# Patient Record
Sex: MALE
[De-identification: names, ages, dates, MRNs, and addresses within clinical notes are randomized per-mention and may not be internally consistent; named-entity substitution may affect disease eponyms.]

---

## 2021-07-06 PROBLEM — Z00.00 ENCOUNTER FOR PREVENTIVE HEALTH EXAMINATION: Status: ACTIVE | Noted: 2021-07-06

## 2021-07-08 ENCOUNTER — APPOINTMENT (OUTPATIENT)
Dept: ORTHOPEDIC SURGERY | Facility: CLINIC | Age: 76
End: 2021-07-08
Payer: MEDICARE

## 2021-07-08 VITALS — SYSTOLIC BLOOD PRESSURE: 118 MMHG | HEIGHT: 67 IN | DIASTOLIC BLOOD PRESSURE: 70 MMHG | HEART RATE: 73 BPM

## 2021-07-08 DIAGNOSIS — Z87.39 PERSONAL HISTORY OF OTHER DISEASES OF THE MUSCULOSKELETAL SYSTEM AND CONNECTIVE TISSUE: ICD-10-CM

## 2021-07-08 DIAGNOSIS — Z80.8 FAMILY HISTORY OF MALIGNANT NEOPLASM OF OTHER ORGANS OR SYSTEMS: ICD-10-CM

## 2021-07-08 DIAGNOSIS — M25.572 PAIN IN LEFT ANKLE AND JOINTS OF LEFT FOOT: ICD-10-CM

## 2021-07-08 DIAGNOSIS — Z80.0 FAMILY HISTORY OF MALIGNANT NEOPLASM OF DIGESTIVE ORGANS: ICD-10-CM

## 2021-07-08 DIAGNOSIS — Z78.9 OTHER SPECIFIED HEALTH STATUS: ICD-10-CM

## 2021-07-08 DIAGNOSIS — M79.672 PAIN IN LEFT FOOT: ICD-10-CM

## 2021-07-08 DIAGNOSIS — Z86.39 PERSONAL HISTORY OF OTHER ENDOCRINE, NUTRITIONAL AND METABOLIC DISEASE: ICD-10-CM

## 2021-07-08 DIAGNOSIS — Z86.79 PERSONAL HISTORY OF OTHER DISEASES OF THE CIRCULATORY SYSTEM: ICD-10-CM

## 2021-07-08 PROCEDURE — 73610 X-RAY EXAM OF ANKLE: CPT | Mod: LT

## 2021-07-08 PROCEDURE — 73620 X-RAY EXAM OF FOOT: CPT | Mod: LT

## 2021-07-08 PROCEDURE — 99203 OFFICE O/P NEW LOW 30 MIN: CPT

## 2021-07-08 RX ORDER — LORAZEPAM 0.5 MG/1
0.5 TABLET ORAL
Refills: 0 | Status: ACTIVE | COMMUNITY

## 2021-07-08 RX ORDER — HYDROCHLOROTHIAZIDE 25 MG/1
25 TABLET ORAL
Refills: 0 | Status: ACTIVE | COMMUNITY

## 2021-07-08 RX ORDER — LAMOTRIGINE 200 MG/1
200 TABLET ORAL
Refills: 0 | Status: ACTIVE | COMMUNITY

## 2021-07-08 RX ORDER — DOXAZOSIN 4 MG/1
4 TABLET ORAL
Refills: 0 | Status: ACTIVE | COMMUNITY

## 2021-07-08 RX ORDER — TELMISARTAN 80 MG/1
80 TABLET ORAL
Refills: 0 | Status: ACTIVE | COMMUNITY

## 2021-07-08 RX ORDER — CARBAMAZEPINE 400 MG/1
400 TABLET, EXTENDED RELEASE ORAL
Refills: 0 | Status: ACTIVE | COMMUNITY

## 2021-07-08 RX ORDER — PRAVASTATIN SODIUM 20 MG/1
20 TABLET ORAL
Refills: 0 | Status: ACTIVE | COMMUNITY

## 2021-07-08 RX ORDER — DONEPEZIL HYDROCHLORIDE 5 MG/1
5 TABLET ORAL
Refills: 0 | Status: ACTIVE | COMMUNITY

## 2021-07-08 RX ORDER — TRAZODONE HYDROCHLORIDE 50 MG/1
50 TABLET ORAL
Refills: 0 | Status: ACTIVE | COMMUNITY

## 2021-07-08 NOTE — PROCEDURE
[de-identified] : Fitted for a cane and walking with a cane and sneaker he did have some pain relief

## 2021-07-08 NOTE — HISTORY OF PRESENT ILLNESS
[de-identified] : Mr. Leiva is a 75 yo gentleman who presents for pain in his left lateral hindfoot radiating to the fifth toe that started 2 weeks ago without any inciting event or injury.  No prior foot issues.  He has pain walking and weightbearing particularly if he is barefoot but is better if he is wearing a thick shoe such as a sneaker.  He has new balance shoes on today which are over 10 years old.  Pain is dull and sometimes achy and can be a 1-8 out of 10 and intermittent.  There was never any swelling or bruising or redness.  He had been focusing on walking heel-to-toe about 5 times a week.  He had been diagnosed with possible Parkinson's but did not respond to the medication so it is felt that it is likely something else.  He has not had any other treatment or work-up.  He is not supposed to take any NSAIDs but can take Tylenol because of kidney issues.

## 2021-07-08 NOTE — PHYSICAL EXAM
[Slightly Antalgic] : slightly antalgic [LE] : Sensory: Intact in bilateral lower extremities [DP] : dorsalis pedis 2+ and symmetric bilaterally [PT] : posterior tibial 2+ and symmetric bilaterally [Normal RLE] : Right Lower Extremity: No scars, rashes, lesions, ulcers, skin intact [Normal LLE] : Left Lower Extremity: No scars, rashes, lesions, ulcers, skin intact [Normal Touch] : sensation intact for touch [Normal] : Oriented to person, place, and time, insight and judgement were intact and the affect was normal [de-identified] : Left foot and ankle\par When walking he feels pain more in the plantar lateral heel.\par He can raise up on his heels and toes.\par Mildly antalgic gait.\par Hyperpronation/pes planovalgus deformity left slightly greater than right.\par No edema, ecchymoses, erythema.\par Tender lateral > plantar calcaneus, peroneal tendons around the tip of the fibula, mildly tender cuboid.  Nontender fifth metatarsal and fifth toe.  Mildly tender sinus Tarsi.\par Nontender at the first tarsometatarsal joint where there are large palpable osteophytes.\par Intact anterior tibial tendon, gastrocsoleus, peroneals, posterior tibial tendon, EHL.  Tendons all feel intact.\par Sensation is intact.\par Dorsalis pedis and posterior tibial pulses 2+. [de-identified] : \par X-rays of the left foot and ankle weightbearing 5 views were ordered and performed today with degenerative changes severe with osteophytes at the first tarsometatarsal joint.  Ankle mortise is intact without any significant arthritis.  No visible fractures seen

## 2021-07-08 NOTE — ASSESSMENT
[FreeTextEntry1] : 75 yo w/ left lateral hindfoot pain without any clear etiology.  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  His pain was difficult to localize but was significant affecting his gait particularly if he was barefoot.  Because of his Parkinson's-like symptoms and age and balance I felt it would be difficult for him to walk with a cam walker boot and therefore we will have him try wearing just a very stiff supportive shoe and suggest getting new shoes if possible and walking with a cane.  He should limit his walking for the time being and can do some warm soaks and elevate and ice as needed.\par I suggested getting an MRI to evaluate further because if there is a fracture he may need to use a boot or walker temporarily.\par He can try some Voltaren gel but cannot take NSAIDs because of kidney issues.  He can take Tylenol if needed for pain.\par I will call him with MRI results and otherwise I would see him back in the next 3 to 4 weeks.

## 2021-07-08 NOTE — REASON FOR VISIT
[Initial Visit] : an initial visit for [Spouse] : spouse [FreeTextEntry2] : left foot and ankle pain

## 2021-07-19 ENCOUNTER — NON-APPOINTMENT (OUTPATIENT)
Age: 76
End: 2021-07-19

## 2021-08-03 ENCOUNTER — APPOINTMENT (OUTPATIENT)
Dept: ORTHOPEDIC SURGERY | Facility: CLINIC | Age: 76
End: 2021-08-03
Payer: MEDICARE

## 2021-08-03 DIAGNOSIS — M19.072 PRIMARY OSTEOARTHRITIS, LEFT ANKLE AND FOOT: ICD-10-CM

## 2021-08-03 DIAGNOSIS — M21.41 FLAT FOOT [PES PLANUS] (ACQUIRED), RIGHT FOOT: ICD-10-CM

## 2021-08-03 DIAGNOSIS — M21.42 FLAT FOOT [PES PLANUS] (ACQUIRED), LEFT FOOT: ICD-10-CM

## 2021-08-03 PROCEDURE — 99213 OFFICE O/P EST LOW 20 MIN: CPT

## 2021-08-03 NOTE — HISTORY OF PRESENT ILLNESS
[de-identified] : Mr. Leiva states that his left foot is feeling much better.  After a few weeks of using the cane and walking in sneakers the pain on the lateral side of his foot resolved.  He now states that he has some intermittent pain more in the medial midfoot area.  His sneakers are several years old.  He did use some of the Voltaren gel\par

## 2021-08-03 NOTE — ASSESSMENT
[FreeTextEntry1] : 77 yo w/ left lateral hindfoot pain.  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  His lateral foot pain resolved.  He has pain more in the medial arch which makes more sense given the arthritis in this area and the collapse of the arch.\par I recommended getting new sneakers since his are several years old.  I suggested looking at [employer] versus new balance.  He can get off-the-shelf orthotics and if these do not work then we may consider custom orthotics.\par Warm soaks and ice as needed and Voltaren gel as needed.\par Cane as needed\par Follow-up if he has any increased pain or issues.

## 2021-09-07 ENCOUNTER — TRANSCRIPTION ENCOUNTER (OUTPATIENT)
Age: 76
End: 2021-09-07

## 2024-08-13 NOTE — PHYSICAL EXAM
6 mth f/u HTN, hyperlipidemia     [Slightly Antalgic] : slightly antalgic [LE] : Sensory: Intact in bilateral lower extremities [DP] : dorsalis pedis 2+ and symmetric bilaterally [PT] : posterior tibial 2+ and symmetric bilaterally [Normal RLE] : Right Lower Extremity: No scars, rashes, lesions, ulcers, skin intact [Normal LLE] : Left Lower Extremity: No scars, rashes, lesions, ulcers, skin intact [Normal Touch] : sensation intact for touch [Normal] : Oriented to person, place, and time, insight and judgement were intact and the affect was normal [de-identified] : Left foot and ankle\par Walking in sneakers is without pain.\par He can raise up on his heels and toes.  Arch dynamics compromised\par Mildly antalgic gait.\par Hyperpronation/pes planovalgus deformity left slightly greater than right.  Fixed supination deformity forefoot.  Prominent medial cuneiform plantar left\par No edema, ecchymoses, erythema.\par Tender mildly at the first tarsometatarsal joint today.  No other tenderness\par first tarsometatarsal joint -there are large palpable osteophytes.\par Intact anterior tibial tendon, gastrocsoleus, peroneals, posterior tibial tendon, EHL.  Tendons all feel intact.\par Sensation is intact.\par Dorsalis pedis and posterior tibial pulses 2+. [de-identified] : \par X-rays of the left foot and ankle weightbearing 5 views were ordered and performed today with degenerative changes severe with osteophytes at the first tarsometatarsal joint.  Ankle mortise is intact without any significant arthritis.  No visible fractures seen